# Patient Record
(demographics unavailable — no encounter records)

---

## 2024-11-05 NOTE — PHYSICAL EXAM
[FreeTextEntry1] : Alert and oriented x3 Moves all extremities no edema Abdomen soft nontender External anal exam flap in place no signs of recurrent disease. Digital rectal exam normal tone no palpable masses or lesions Anoscopy-procedure performed in standard fashion under direct vision an adult scope. Patient tolerated without event or complication -No proctitis -No signs of recurrent disease

## 2024-11-05 NOTE — HISTORY OF PRESENT ILLNESS
[FreeTextEntry1] : Status post excision of perianal Paget's disease. Patient with positive medial margin. Currently progressing well. Denies pain or palpable lesion. No fevers or chills no nausea or vomiting. No blood per rectum. No aggravating factors  November 21, 2023-patient progressing well. Tolerating diet. No fevers or chills no nausea or vomiting no palpable lesions no blood per rectum no itching or pain in the perirectal area. No aggravating factors  May 7, 2024-patient progressing well. She denies palpable lesions in the perianal area no pain no swelling no blood per rectum no fevers or chills no nausea or vomiting. No admitting factors  November 5, 2024-patient progressing well.  Denies palpable lesion or mass at previous excision site. No itching or irritation no blood per rectum. No aggravating factors

## 2024-11-05 NOTE — ASSESSMENT
[FreeTextEntry1] : Perianal Lucrecia's disease -No signs of recurrent disease or masses -Continue surveillance -Patient followup in 6 months for physical exam

## 2025-05-13 NOTE — ASSESSMENT
[FreeTextEntry1] : Extramammary Paget's disease - No signs of recurrent disease - Patient to follow-up in 1 year for surveillance physical exam - High-fiber diet - All questions answered

## 2025-05-13 NOTE — PHYSICAL EXAM
[FreeTextEntry1] : Alert and oriented x 3 Moves all extremities no edema Abdomen soft nontender External anal exam no masses or lesions no signs of recurrent disease Digital rectal exam no palpable lesions no mucosal abnormality Anoscopy-procedure performed in standard fashion under direct vision with a standard adult anoscope.  Patient tolerated without event or complication. - No mucosal abnormality or lesion

## 2025-05-13 NOTE — HISTORY OF PRESENT ILLNESS
[FreeTextEntry1] : Status post excision of perianal Paget's disease. Patient with positive medial margin. Currently progressing well. Denies pain or palpable lesion. No fevers or chills no nausea or vomiting. No blood per rectum. No aggravating factors  November 21, 2023-patient progressing well. Tolerating diet. No fevers or chills no nausea or vomiting no palpable lesions no blood per rectum no itching or pain in the perirectal area. No aggravating factors  May 7, 2024-patient progressing well. She denies palpable lesions in the perianal area no pain no swelling no blood per rectum no fevers or chills no nausea or vomiting. No admitting factors  November 5, 2024-patient progressing well.  Denies palpable lesion or mass at previous excision site. No itching or irritation no blood per rectum. No aggravating factors  May 13, 2025-patient progressing well.  Denies pain or drainage.  No fevers or chills no nausea or vomiting.  Normal bowel movements no palpable lesions.  No aggravating factor